# Patient Record
Sex: FEMALE | Race: WHITE | URBAN - NONMETROPOLITAN AREA
[De-identification: names, ages, dates, MRNs, and addresses within clinical notes are randomized per-mention and may not be internally consistent; named-entity substitution may affect disease eponyms.]

---

## 2018-02-02 ENCOUNTER — PATIENT OUTREACH (OUTPATIENT)
Dept: FAMILY MEDICINE CLINIC | Facility: CLINIC | Age: 53
End: 2018-02-02

## 2018-08-29 ENCOUNTER — TELEPHONE (OUTPATIENT)
Dept: FAMILY MEDICINE CLINIC | Facility: CLINIC | Age: 53
End: 2018-08-29

## 2019-01-23 ENCOUNTER — TELEPHONE (OUTPATIENT)
Dept: FAMILY MEDICINE CLINIC | Facility: CLINIC | Age: 54
End: 2019-01-23

## 2019-01-23 NOTE — TELEPHONE ENCOUNTER
Not seen since October 2016. Needs to schedule office visit and fasting labs-due for mammogram, colonoscopy, papsmear. Left detailed message for pt to call back and schedule appointment asap.

## 2019-03-04 ENCOUNTER — PATIENT OUTREACH (OUTPATIENT)
Dept: FAMILY MEDICINE CLINIC | Facility: CLINIC | Age: 54
End: 2019-03-04

## 2019-07-31 ENCOUNTER — PATIENT OUTREACH (OUTPATIENT)
Dept: FAMILY MEDICINE CLINIC | Facility: CLINIC | Age: 54
End: 2019-07-31

## 2019-07-31 NOTE — PROGRESS NOTES
Due for physical and colon cancer screening. [phone] (home)   Left message for patient to call back to schedule at earliest convenience.

## 2019-09-11 ENCOUNTER — PATIENT OUTREACH (OUTPATIENT)
Dept: FAMILY MEDICINE CLINIC | Facility: CLINIC | Age: 54
End: 2019-09-11

## 2019-10-07 ENCOUNTER — PATIENT OUTREACH (OUTPATIENT)
Dept: FAMILY MEDICINE CLINIC | Facility: CLINIC | Age: 54
End: 2019-10-07

## 2019-11-04 ENCOUNTER — TELEPHONE (OUTPATIENT)
Dept: FAMILY MEDICINE CLINIC | Facility: CLINIC | Age: 54
End: 2019-11-04

## 2019-11-04 NOTE — TELEPHONE ENCOUNTER
Spoke with patient and she verbalized understanding. She is going to have them fax the pre-op orders over today. Will call patient once we receive the orders and schedule a physical/pre-op clearance. Awaiting fax.

## 2019-11-04 NOTE — TELEPHONE ENCOUNTER
Pre-op order received and placed on Dr. Ibis Lopez desk. Please advise this nurse if ok to schedule.

## 2019-11-04 NOTE — TELEPHONE ENCOUNTER
Spoke with patient and informed her that we will need pre-op orders prior to scheduling. She will have their office fax over orders. She is also wondering if she can combine this pre-op visit with a complete physical as she states it has been a while.  Patricia

## 2019-11-04 NOTE — TELEPHONE ENCOUNTER
SURGERY ON  11/22     NEEDS PREOP EKG, SHE IS DUE FOR HER CPX  ASKING  IF SHE CAN HAVE BOTH AT SAME TIME? SHE WAS TOLD SHE DIDN'T NEED A PRESURGICAL EXAM, ONLY EKG.

## 2019-11-04 NOTE — TELEPHONE ENCOUNTER
As I have not been seen the patient in over 3 years. I would ask her to schedule for a physical which can be used as a preoperative evaluation as well.   I do need records from her surgeon

## 2019-11-04 NOTE — TELEPHONE ENCOUNTER
Okay to schedule preop but I would really like a note from the physician telling me what the problem is and what he plans to do and what kind of anesthesia as well as the date of the procedure.

## 2019-11-08 ENCOUNTER — TELEPHONE (OUTPATIENT)
Dept: FAMILY MEDICINE CLINIC | Facility: CLINIC | Age: 54
End: 2019-11-08

## 2019-11-08 NOTE — TELEPHONE ENCOUNTER
Patient scheduled.    Future Appointments   Date Time Provider Eula Barnett   11/15/2019  9:45 AM Adela Jimenez., DO [facility] [place]

## 2019-11-15 ENCOUNTER — MED REC SCAN ONLY (OUTPATIENT)
Dept: FAMILY MEDICINE CLINIC | Facility: CLINIC | Age: 54
End: 2019-11-15

## 2019-11-15 ENCOUNTER — OFFICE VISIT (OUTPATIENT)
Dept: FAMILY MEDICINE CLINIC | Facility: CLINIC | Age: 54
End: 2019-11-15
Payer: COMMERCIAL

## 2019-11-15 ENCOUNTER — APPOINTMENT (OUTPATIENT)
Dept: LAB | Age: 54
End: 2019-11-15
Attending: FAMILY MEDICINE
Payer: COMMERCIAL

## 2019-11-15 VITALS
TEMPERATURE: 98 F | BODY MASS INDEX: 40.49 KG/M2 | SYSTOLIC BLOOD PRESSURE: 122 MMHG | HEIGHT: 65.25 IN | OXYGEN SATURATION: 99 % | DIASTOLIC BLOOD PRESSURE: 82 MMHG | WEIGHT: 246 LBS | HEART RATE: 85 BPM

## 2019-11-15 DIAGNOSIS — R53.83 FATIGUE, UNSPECIFIED TYPE: ICD-10-CM

## 2019-11-15 DIAGNOSIS — Z13.220 SCREENING FOR LIPID DISORDERS: ICD-10-CM

## 2019-11-15 DIAGNOSIS — Z13.29 SCREENING FOR THYROID DISORDER: ICD-10-CM

## 2019-11-15 DIAGNOSIS — Z13.1 SCREENING FOR DIABETES MELLITUS: ICD-10-CM

## 2019-11-15 DIAGNOSIS — Z01.818 PRE-OP EVALUATION: Primary | ICD-10-CM

## 2019-11-15 DIAGNOSIS — G56.03 BILATERAL CARPAL TUNNEL SYNDROME: ICD-10-CM

## 2019-11-15 DIAGNOSIS — E66.01 MORBID OBESITY WITH BMI OF 40.0-44.9, ADULT (HCC): ICD-10-CM

## 2019-11-15 DIAGNOSIS — G56.22 CUBITAL TUNNEL SYNDROME ON LEFT: ICD-10-CM

## 2019-11-15 PROCEDURE — 84443 ASSAY THYROID STIM HORMONE: CPT | Performed by: FAMILY MEDICINE

## 2019-11-15 PROCEDURE — 80061 LIPID PANEL: CPT | Performed by: FAMILY MEDICINE

## 2019-11-15 PROCEDURE — 80053 COMPREHEN METABOLIC PANEL: CPT | Performed by: FAMILY MEDICINE

## 2019-11-15 PROCEDURE — 99396 PREV VISIT EST AGE 40-64: CPT | Performed by: FAMILY MEDICINE

## 2019-11-15 PROCEDURE — 83036 HEMOGLOBIN GLYCOSYLATED A1C: CPT | Performed by: FAMILY MEDICINE

## 2019-11-15 PROCEDURE — 36415 COLL VENOUS BLD VENIPUNCTURE: CPT | Performed by: FAMILY MEDICINE

## 2019-11-15 PROCEDURE — 93000 ELECTROCARDIOGRAM COMPLETE: CPT | Performed by: FAMILY MEDICINE

## 2019-11-15 NOTE — H&P
HPI:   Donny Dow is a 48year old female who presents for a preoperative consultation as requested by Dr. Bolivar Laird.   Patient is scheduled for left cubital tunnel release with possible anterior transposition and left endoscopic versus open carpal alexandra Cancer Brother         non-Hodkin's lymphoma   • Heart Disorder Brother    • Diabetes Brother    • Diabetes Brother    • Diabetes Brother       Social History:   Social History    Tobacco Use      Smoking status: Former Smoker        Quit date: 11/15/1995 rashes,no suspicious lesions, wlat complexion, fair-skin, scattered AKs on forearms  HEENT: Ears:  TMs intact, canals clear, hearing normal, Nose: turbinates clear,Septum midline, Pharynx: no pnd, erythema, or airway obstruction, class 2 air way  EYES:PER health screening measures, would recommend a baseline colonoscopy. Patient may check with her insurance for coverage on Cologuard as an alternative  Discussed importance of monthly self breast exams and mammograms annually.   Will obtain records of most re

## 2019-11-16 DIAGNOSIS — R73.03 PREDIABETES: Primary | ICD-10-CM

## 2019-11-16 DIAGNOSIS — E78.00 ELEVATED CHOLESTEROL: ICD-10-CM

## 2020-09-02 ENCOUNTER — TELEPHONE (OUTPATIENT)
Dept: FAMILY MEDICINE CLINIC | Facility: CLINIC | Age: 55
End: 2020-09-02

## 2020-11-23 ENCOUNTER — OFFICE VISIT (OUTPATIENT)
Dept: FAMILY MEDICINE CLINIC | Facility: CLINIC | Age: 55
End: 2020-11-23
Payer: COMMERCIAL

## 2020-11-23 VITALS
DIASTOLIC BLOOD PRESSURE: 80 MMHG | HEART RATE: 83 BPM | WEIGHT: 239 LBS | SYSTOLIC BLOOD PRESSURE: 120 MMHG | HEIGHT: 65.25 IN | BODY MASS INDEX: 39.34 KG/M2 | TEMPERATURE: 97 F | OXYGEN SATURATION: 99 %

## 2020-11-23 DIAGNOSIS — R10.13 EPIGASTRIC PAIN: Primary | ICD-10-CM

## 2020-11-23 DIAGNOSIS — M54.6 ACUTE BILATERAL THORACIC BACK PAIN: ICD-10-CM

## 2020-11-23 PROCEDURE — 3079F DIAST BP 80-89 MM HG: CPT | Performed by: FAMILY MEDICINE

## 2020-11-23 PROCEDURE — 99213 OFFICE O/P EST LOW 20 MIN: CPT | Performed by: FAMILY MEDICINE

## 2020-11-23 PROCEDURE — 3008F BODY MASS INDEX DOCD: CPT | Performed by: FAMILY MEDICINE

## 2020-11-23 PROCEDURE — 93000 ELECTROCARDIOGRAM COMPLETE: CPT | Performed by: FAMILY MEDICINE

## 2020-11-23 PROCEDURE — 3074F SYST BP LT 130 MM HG: CPT | Performed by: FAMILY MEDICINE

## 2020-11-23 PROCEDURE — 99072 ADDL SUPL MATRL&STAF TM PHE: CPT | Performed by: FAMILY MEDICINE

## 2020-11-23 NOTE — PROGRESS NOTES
HPI:    Patient ID: Tia Cantu is a 50year old female. Patient presents with:  Back Pain: sat afternoon, sharp pain in middle of back, sweating, nausous, less than 10 min, happened again after laying down/ felt warm.      Pt had been moving some thi guarding. Musculoskeletal:      Thoracic back: Normal.      Lumbar back: Normal.   Neurological: She is alert and oriented to person, place, and time. Skin: Skin is warm and dry. Psychiatric: She has a normal mood and affect.    Blood pressure 120/80,

## 2020-11-23 NOTE — PATIENT INSTRUCTIONS
I discussed various possible causes for patient's symptoms including esophageal spasm, biliary colic, reflux, acute muscular spasm, cardiovascular etiologies less likely.   Of asked patient to eat a low-fat low-carb diet, begin some form of daily aerobic ac

## 2021-06-18 ENCOUNTER — TELEPHONE (OUTPATIENT)
Dept: FAMILY MEDICINE CLINIC | Facility: CLINIC | Age: 56
End: 2021-06-18

## 2021-06-18 NOTE — TELEPHONE ENCOUNTER
Spoke with patient who states she tested positive for COVID on 6/15/21. Her remaining symptoms are headache and congestion. She never has had a fever. She is wanting to know when she can go back to work. She runs a senior living facility. Please advise.

## 2021-06-18 NOTE — TELEPHONE ENCOUNTER
Pt is covid positive since Corazon 15. She wants to know how long to be isolated and has some other questions. 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

## 2021-06-18 NOTE — TELEPHONE ENCOUNTER
Current CDC guidelines state that patient may discontinue isolation 10 days from onset of symptoms that as long as he remains afebrile and symptoms are improving.

## 2021-12-15 ENCOUNTER — TELEPHONE (OUTPATIENT)
Dept: FAMILY MEDICINE CLINIC | Facility: CLINIC | Age: 56
End: 2021-12-15

## 2021-12-15 DIAGNOSIS — Z12.31 SCREENING MAMMOGRAM FOR BREAST CANCER: Primary | ICD-10-CM

## 2021-12-15 NOTE — TELEPHONE ENCOUNTER
Yaneth Mcdowell is calling she needs an order for a mammo faxed over to [facility] please call once this is done thank you

## 2021-12-16 ENCOUNTER — TELEPHONE (OUTPATIENT)
Dept: FAMILY MEDICINE CLINIC | Facility: CLINIC | Age: 56
End: 2021-12-16

## 2021-12-16 NOTE — TELEPHONE ENCOUNTER
[id number] [address] Crista Espinosa NEVER RECEIVED MAMMO ORDER, PLEASE CALL PT BEFORE REFAXING, SOMETHING ABOUT SINCE SHE IS HAVING SYMPTOMS THAT ORDER NEEDS TO BE CHANGED

## 2021-12-16 NOTE — TELEPHONE ENCOUNTER
KINJAL---spoke with pt. She called yesterday and asked for a mammogram order to be faxed to [address] [place] [id number] . Order was faxed. Pt called there today to schedule an appt and was told the order hasn't been rec'd yet, and it would need to be refaxed.    *In that discussion,

## 2021-12-17 ENCOUNTER — OFFICE VISIT (OUTPATIENT)
Dept: FAMILY MEDICINE CLINIC | Facility: CLINIC | Age: 56
End: 2021-12-17
Payer: COMMERCIAL

## 2021-12-17 VITALS
SYSTOLIC BLOOD PRESSURE: 130 MMHG | DIASTOLIC BLOOD PRESSURE: 88 MMHG | BODY MASS INDEX: 38.52 KG/M2 | HEART RATE: 84 BPM | OXYGEN SATURATION: 98 % | HEIGHT: 65.25 IN | RESPIRATION RATE: 16 BRPM | TEMPERATURE: 99 F | WEIGHT: 234 LBS

## 2021-12-17 DIAGNOSIS — R07.89 CHEST WALL DISCOMFORT: ICD-10-CM

## 2021-12-17 DIAGNOSIS — Z12.31 ENCOUNTER FOR SCREENING MAMMOGRAM FOR BREAST CANCER: Primary | ICD-10-CM

## 2021-12-17 PROCEDURE — 3079F DIAST BP 80-89 MM HG: CPT | Performed by: FAMILY MEDICINE

## 2021-12-17 PROCEDURE — 3075F SYST BP GE 130 - 139MM HG: CPT | Performed by: FAMILY MEDICINE

## 2021-12-17 PROCEDURE — 3008F BODY MASS INDEX DOCD: CPT | Performed by: FAMILY MEDICINE

## 2021-12-17 PROCEDURE — 99213 OFFICE O/P EST LOW 20 MIN: CPT | Performed by: FAMILY MEDICINE

## 2021-12-17 NOTE — PATIENT INSTRUCTIONS
I advised patient today feel her symptoms are more related to chest wall spasms rather than breast disease but will certainly encourage screening 3D mammogram.  Order faxed to [facility] the patient's request  Reviewed proper posture and workplace ergonom

## 2021-12-17 NOTE — PROGRESS NOTES
Guido Curtis is a 49year old female. Patient presents with:  Breast Pain: left; pulling feeling; going on for month happening more frequent      HPI:   C/o left breast pain off and on over the past yr, occurs every few months, fleeting, may occur a few t pain  EXAM:   /88   Pulse 84   Temp 99 °F (37.2 °C) (Temporal)   Resp 16   Ht 5' 5.25\" (1.657 m)   Wt 234 lb (106.1 kg)   LMP 01/15/2013   SpO2 98%   BMI 38.64 kg/m²   GENERAL: well developed, well nourished,in no apparent distress, well hydrated  S

## 2022-02-21 ENCOUNTER — TELEPHONE (OUTPATIENT)
Dept: FAMILY MEDICINE CLINIC | Facility: CLINIC | Age: 57
End: 2022-02-21

## 2022-02-22 NOTE — TELEPHONE ENCOUNTER
ADVISE PT MAMMOGRAM OK / BENIGN FINDINGS, REPEAT ANNUALLY  If breast tenderness persists, would recommend opinion from Dr. Jimy Rider, breast surgeon    Elizabeth Quiñonez.  Mohsen Restrepo

## 2022-02-22 NOTE — TELEPHONE ENCOUNTER
Pt advised of below. States she is still having L breast pain at least every other day. She will c/b to schedule an appt with Dr. Eugenio Cummings.  Referral placed

## 2022-06-17 ENCOUNTER — OFFICE VISIT (OUTPATIENT)
Dept: FAMILY MEDICINE CLINIC | Facility: CLINIC | Age: 57
End: 2022-06-17
Payer: COMMERCIAL

## 2022-06-17 VITALS
WEIGHT: 230 LBS | DIASTOLIC BLOOD PRESSURE: 93 MMHG | HEIGHT: 66 IN | BODY MASS INDEX: 36.96 KG/M2 | TEMPERATURE: 98 F | HEART RATE: 102 BPM | SYSTOLIC BLOOD PRESSURE: 127 MMHG | OXYGEN SATURATION: 98 % | RESPIRATION RATE: 15 BRPM

## 2022-06-17 DIAGNOSIS — B34.9 VIRAL SYNDROME: ICD-10-CM

## 2022-06-17 DIAGNOSIS — J02.9 SORE THROAT: Primary | ICD-10-CM

## 2022-06-17 LAB — CONTROL LINE PRESENT WITH A CLEAR BACKGROUND (YES/NO): YES YES/NO

## 2022-06-17 PROCEDURE — 87081 CULTURE SCREEN ONLY: CPT | Performed by: PHYSICIAN ASSISTANT

## 2022-06-17 PROCEDURE — 3080F DIAST BP >= 90 MM HG: CPT | Performed by: PHYSICIAN ASSISTANT

## 2022-06-17 PROCEDURE — 3008F BODY MASS INDEX DOCD: CPT | Performed by: PHYSICIAN ASSISTANT

## 2022-06-17 PROCEDURE — 3074F SYST BP LT 130 MM HG: CPT | Performed by: PHYSICIAN ASSISTANT

## 2022-06-17 PROCEDURE — 99213 OFFICE O/P EST LOW 20 MIN: CPT | Performed by: PHYSICIAN ASSISTANT

## 2022-06-17 PROCEDURE — 87880 STREP A ASSAY W/OPTIC: CPT | Performed by: PHYSICIAN ASSISTANT

## 2022-06-18 LAB — SARS-COV-2 RNA RESP QL NAA+PROBE: DETECTED

## 2022-11-14 ENCOUNTER — TELEPHONE (OUTPATIENT)
Dept: FAMILY MEDICINE CLINIC | Facility: CLINIC | Age: 57
End: 2022-11-14

## 2022-11-14 NOTE — TELEPHONE ENCOUNTER
HEART HAS BEEN RACING DAILY & BP ELEV FOR ABOUT A WEEK, FAMILY HX OF HEART DISEASE, NO OPENINGS TILL Thursday, CALL PT

## 2022-11-14 NOTE — TELEPHONE ENCOUNTER
KINJAL--spoke with pt. Reports episodes of her heart feeling like it's \"racing\" and \"fluttering\" x1 week. Has been checking BP also---180/140 after running up and down stairs several times. Has been 140/ 80-90 at reast. HR ~80 at rest.  She has only checked HR with BP monitor. Instructed pt on how to check pulse manually. Pt concerned because family hx of heart disease. Denies chest pain. Reports increased life stress. OV scheduled on 11/17/22 (first available). Advised to continue to check both and bring in BP and HR readings for review. If you want to see her sooner/ please send back to me.   Otherwise you can close chart

## 2022-11-17 ENCOUNTER — OFFICE VISIT (OUTPATIENT)
Dept: FAMILY MEDICINE CLINIC | Facility: CLINIC | Age: 57
End: 2022-11-17
Payer: COMMERCIAL

## 2022-11-17 VITALS
WEIGHT: 239 LBS | HEART RATE: 86 BPM | OXYGEN SATURATION: 97 % | BODY MASS INDEX: 39 KG/M2 | DIASTOLIC BLOOD PRESSURE: 86 MMHG | SYSTOLIC BLOOD PRESSURE: 122 MMHG | TEMPERATURE: 98 F

## 2022-11-17 DIAGNOSIS — R00.2 PALPITATIONS: Primary | ICD-10-CM

## 2022-11-17 DIAGNOSIS — F43.20 ADULT SITUATIONAL STRESS DISORDER: ICD-10-CM

## 2022-11-17 DIAGNOSIS — F41.9 ANXIETY: ICD-10-CM

## 2022-11-17 PROCEDURE — 3079F DIAST BP 80-89 MM HG: CPT | Performed by: FAMILY MEDICINE

## 2022-11-17 PROCEDURE — 99214 OFFICE O/P EST MOD 30 MIN: CPT | Performed by: FAMILY MEDICINE

## 2022-11-17 PROCEDURE — 3074F SYST BP LT 130 MM HG: CPT | Performed by: FAMILY MEDICINE

## 2022-11-17 NOTE — PATIENT INSTRUCTIONS
30 minutes spent discussing palpitations, fibrillation, pathologic arrhythmias and contributing factors. I advised patient that I strongly suspect she has benign PVCs that are related to her recent stressors. Past EKG was unremarkable for similar symptoms 2 years ago. Discussed conservative stress management strategies including relaxation breathing, visualizing, prayer, aerobic activity etc.  Discussed medication options however patient declines need for them at this time. Will check 48-hour Holter to rule out any sustained arrhythmias. If increased burden of PVCs/PACs, patient may benefit from low-dose beta-blocker.   Further recommendations pending Holter

## 2022-11-29 ENCOUNTER — HOSPITAL ENCOUNTER (OUTPATIENT)
Dept: GENERAL RADIOLOGY | Age: 57
Discharge: HOME | End: 2022-11-29
Attending: FAMILY MEDICINE
Payer: COMMERCIAL

## 2022-11-29 DIAGNOSIS — R00.2 PALPITATIONS: ICD-10-CM

## 2022-11-29 PROCEDURE — 93225 XTRNL ECG REC<48 HRS REC: CPT | Performed by: FAMILY MEDICINE

## 2024-02-26 ENCOUNTER — OFFICE VISIT (OUTPATIENT)
Dept: FAMILY MEDICINE CLINIC | Facility: CLINIC | Age: 59
End: 2024-02-26
Payer: COMMERCIAL

## 2024-02-26 VITALS
HEART RATE: 89 BPM | TEMPERATURE: 98 F | HEIGHT: 66 IN | DIASTOLIC BLOOD PRESSURE: 86 MMHG | WEIGHT: 240 LBS | SYSTOLIC BLOOD PRESSURE: 138 MMHG | BODY MASS INDEX: 38.57 KG/M2 | RESPIRATION RATE: 18 BRPM | OXYGEN SATURATION: 98 %

## 2024-02-26 DIAGNOSIS — R39.9 UTI SYMPTOMS: Primary | ICD-10-CM

## 2024-02-26 LAB
BILIRUBIN: NEGATIVE
GLUCOSE (URINE DIPSTICK): NEGATIVE MG/DL
KETONES (URINE DIPSTICK): NEGATIVE MG/DL
MULTISTIX LOT#: ABNORMAL NUMERIC
NITRITE, URINE: NEGATIVE
PH, URINE: 7 (ref 4.5–8)
PROTEIN (URINE DIPSTICK): 30 MG/DL
SPECIFIC GRAVITY: 1.02 (ref 1–1.03)
URINE-COLOR: YELLOW
UROBILINOGEN,SEMI-QN: 0.2 MG/DL (ref 0–1.9)

## 2024-02-26 PROCEDURE — 87077 CULTURE AEROBIC IDENTIFY: CPT | Performed by: NURSE PRACTITIONER

## 2024-02-26 PROCEDURE — 87086 URINE CULTURE/COLONY COUNT: CPT | Performed by: NURSE PRACTITIONER

## 2024-02-26 PROCEDURE — 87186 SC STD MICRODIL/AGAR DIL: CPT | Performed by: NURSE PRACTITIONER

## 2024-02-26 RX ORDER — NITROFURANTOIN 25; 75 MG/1; MG/1
100 CAPSULE ORAL 2 TIMES DAILY
Qty: 10 CAPSULE | Refills: 0 | Status: SHIPPED | OUTPATIENT
Start: 2024-02-26 | End: 2024-03-02

## 2024-02-27 NOTE — PATIENT INSTRUCTIONS
1. Rest. Drink plenty of fluids.  2. Nitrofurantoin as prescribed.  3. We will send urine culture to lab and call you with results in 3-4 days. At that time we will discuss continuing, stopping, or changing the antibiotic based on the urine culture results.  4. Follow up with PMD in 4-5 days for reeval. Follow up sooner or go to the emergency department immediately if symptoms worsen, change, you develop fevers, flank pain, vomiting, pelvic pain, vaginal discharge/bleeding or if you have any concerns.

## 2024-02-27 NOTE — PROGRESS NOTES
CHIEF COMPLAINT:     Chief Complaint   Patient presents with    UTI     Started today, urgency,  burning        HPI:   Mayelin Duran is a 58 year old female who presents with symptoms of UTI. Complaining of urinary frequency, urgency, dysuria that started today.  Denies flank pain, fever, hematuria, nausea, or vomiting.  Denies abdominal pain, pelvic pain, vaginal discharge, bleeding, itching, or concerns for std's. Tolerates PO well at home. No n/v/d.  Denies any other aggravating or relieving factors at home. Denies any other treatment attempts prior to arrival.       Current Outpatient Medications   Medication Sig Dispense Refill    nitrofurantoin monohydrate macro (MACROBID) 100 MG Oral Cap Take 1 capsule (100 mg total) by mouth 2 (two) times daily for 5 days. 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  GI: Denies abdominal pain. No N/V/D.   : See HPI.  NEURO: no headaches.    EXAM:   /86   Pulse 89   Temp 98.3 °F (36.8 °C)   Resp 18   Ht 5' 6\" (1.676 m)   Wt 240 lb (108.9 kg)   LMP 01/15/2013   SpO2 98%   BMI 38.74 kg/m²   GENERAL: well developed, well nourished,in no apparent distress  CARDIO: RRR, no murmurs  LUNGS: clear to ausculation bilaterally, no wheezing or rhonchi  GI: No tenderness or guarding with palpation.No hepatosplenomegaly.  : No  suprapubic tenderness. No bladder distention, or CVAT     Recent Results (from the past 24 hour(s))   URINALYSIS, AUTO, W/O SCOPE    Collection Time: 02/26/24  6:51 PM   Result Value Ref Range    Glucose Urine Negative Negative mg/dL    Bilirubin Urine Negative Negative    Ketones, UA Negative Negative - Trace mg/dL    Spec Gravity 1.020 1.005 - 1.030    Blood Urine Moderate (A) Negative    PH Urine 7.0 5.0 - 8.0    Protein Urine 30 (A) Negative - Trace mg/dL    Urobilinogen Urine 0.2 0.2 - 1.0 mg/dL    Nitrite Urine Negative Negative    Leukocyte Esterase Urine Moderate (A) Negative    APPEARANCE cloudy Clear    Color Urine yellow Yellow    Multistix Lot# [id number] Numeric    Multistix Expiration Date 12/31/24 Date         ASSESSMENT AND PLAN:       ICD-10-CM    1. UTI symptoms  R39.9 Urine Culture, Routine     URINALYSIS, AUTO, W/O SCOPE     Urine Culture, Routine     nitrofurantoin monohydrate macro (MACROBID) 100 MG Oral Cap        Urine dip: + blood and leuks  Urine culture sent to lab.  Urine hcg: n/a    Discussed HPI and physical exam with pt. Pt has a reassuring physical exam consistent with a lower uti. Treatment options discussed with patient and explained in detail. Will start nitrofurantoin pending urine culture results. The risks, benefits and potential side effects of the medications were reviewed. Alternatives were discussed. Monitoring parameters and expected course outlined. We discussed signs and symptoms that should prompt her to go to the emergency department immediately for evaluation including any fevers, flank pain, abdominal pain, vomiting, vaginal bleeding or if she has any concerns. Patient to call PCP or go to emergency department if symptoms fail to respond as outlined, or worsen in any way. The patient agreed with the plan.       Patient Instructions   1. Rest. Drink plenty of fluids.  2. Nitrofurantoin as prescribed.  3. We will send urine culture to lab and call you with results in 3-4  days. At that time we will discuss continuing, stopping, or changing the antibiotic based on the urine culture results.  4. Follow up with PMD in 4-5 days for reeval. Follow up sooner or go to the emergency department immediately if symptoms worsen, change, you develop fevers, flank pain, vomiting, pelvic pain, vaginal discharge/bleeding or if you have any concerns.

## 2024-08-13 ENCOUNTER — LABORATORY ENCOUNTER (OUTPATIENT)
Dept: LAB | Age: 59
End: 2024-08-13
Attending: FAMILY MEDICINE
Payer: COMMERCIAL

## 2024-08-13 ENCOUNTER — OFFICE VISIT (OUTPATIENT)
Dept: FAMILY MEDICINE CLINIC | Facility: CLINIC | Age: 59
End: 2024-08-13
Payer: COMMERCIAL

## 2024-08-13 VITALS
WEIGHT: 235.38 LBS | BODY MASS INDEX: 38.75 KG/M2 | TEMPERATURE: 98 F | SYSTOLIC BLOOD PRESSURE: 122 MMHG | HEART RATE: 88 BPM | DIASTOLIC BLOOD PRESSURE: 84 MMHG | HEIGHT: 65.5 IN | RESPIRATION RATE: 16 BRPM

## 2024-08-13 DIAGNOSIS — Z13.29 SCREENING FOR THYROID DISORDER: ICD-10-CM

## 2024-08-13 DIAGNOSIS — Z13.220 SCREENING, LIPID: ICD-10-CM

## 2024-08-13 DIAGNOSIS — Z12.31 ENCOUNTER FOR SCREENING MAMMOGRAM FOR BREAST CANCER: ICD-10-CM

## 2024-08-13 DIAGNOSIS — R73.01 IMPAIRED FASTING GLUCOSE: ICD-10-CM

## 2024-08-13 DIAGNOSIS — Z11.51 SCREENING FOR HUMAN PAPILLOMAVIRUS (HPV): ICD-10-CM

## 2024-08-13 DIAGNOSIS — E66.01 SEVERE OBESITY (BMI 35.0-39.9) WITH COMORBIDITY (HCC): ICD-10-CM

## 2024-08-13 DIAGNOSIS — R41.3 MEMORY CHANGES: ICD-10-CM

## 2024-08-13 DIAGNOSIS — Z13.21 ENCOUNTER FOR VITAMIN DEFICIENCY SCREENING: ICD-10-CM

## 2024-08-13 DIAGNOSIS — F43.20 ADULT SITUATIONAL STRESS DISORDER: ICD-10-CM

## 2024-08-13 DIAGNOSIS — Z23 NEED FOR VACCINATION: ICD-10-CM

## 2024-08-13 DIAGNOSIS — Z00.00 PREVENTATIVE HEALTH CARE: ICD-10-CM

## 2024-08-13 DIAGNOSIS — Z12.4 SCREENING FOR CERVICAL CANCER: ICD-10-CM

## 2024-08-13 DIAGNOSIS — Z12.11 SCREEN FOR COLON CANCER: ICD-10-CM

## 2024-08-13 DIAGNOSIS — Z00.00 PREVENTATIVE HEALTH CARE: Primary | ICD-10-CM

## 2024-08-13 LAB
ALBUMIN SERPL-MCNC: 4.9 G/DL (ref 3.2–4.8)
ALBUMIN/GLOB SERPL: 1.6 {RATIO} (ref 1–2)
ALP LIVER SERPL-CCNC: 89 U/L
ALT SERPL-CCNC: 42 U/L
ANION GAP SERPL CALC-SCNC: 7 MMOL/L (ref 0–18)
AST SERPL-CCNC: 34 U/L (ref ?–34)
BILIRUB SERPL-MCNC: 0.8 MG/DL (ref 0.3–1.2)
BUN BLD-MCNC: 12 MG/DL (ref 9–23)
CALCIUM BLD-MCNC: 10.1 MG/DL (ref 8.7–10.4)
CHLORIDE SERPL-SCNC: 105 MMOL/L (ref 98–112)
CHOLEST SERPL-MCNC: 231 MG/DL (ref ?–200)
CO2 SERPL-SCNC: 26 MMOL/L (ref 21–32)
CREAT BLD-MCNC: 0.69 MG/DL
EGFRCR SERPLBLD CKD-EPI 2021: 102 ML/MIN/1.73M2 (ref 60–?)
FASTING PATIENT LIPID ANSWER: YES
FASTING STATUS PATIENT QL REPORTED: YES
GLOBULIN PLAS-MCNC: 3 G/DL (ref 2–3.5)
GLUCOSE BLD-MCNC: 80 MG/DL (ref 70–99)
HDLC SERPL-MCNC: 58 MG/DL (ref 40–59)
LDLC SERPL CALC-MCNC: 157 MG/DL (ref ?–100)
NONHDLC SERPL-MCNC: 173 MG/DL (ref ?–130)
OSMOLALITY SERPL CALC.SUM OF ELEC: 285 MOSM/KG (ref 275–295)
POTASSIUM SERPL-SCNC: 3.9 MMOL/L (ref 3.5–5.1)
PROT SERPL-MCNC: 7.9 G/DL (ref 5.7–8.2)
SODIUM SERPL-SCNC: 138 MMOL/L (ref 136–145)
TRIGL SERPL-MCNC: 92 MG/DL (ref 30–149)
TSI SER-ACNC: 1.34 MIU/ML (ref 0.55–4.78)
VIT B12 SERPL-MCNC: 561 PG/ML (ref 211–911)
VIT D+METAB SERPL-MCNC: 32.1 NG/ML (ref 30–100)
VLDLC SERPL CALC-MCNC: 18 MG/DL (ref 0–30)

## 2024-08-13 PROCEDURE — 82306 VITAMIN D 25 HYDROXY: CPT | Performed by: FAMILY MEDICINE

## 2024-08-13 PROCEDURE — 3079F DIAST BP 80-89 MM HG: CPT | Performed by: FAMILY MEDICINE

## 2024-08-13 PROCEDURE — 83036 HEMOGLOBIN GLYCOSYLATED A1C: CPT | Performed by: FAMILY MEDICINE

## 2024-08-13 PROCEDURE — 88175 CYTOPATH C/V AUTO FLUID REDO: CPT | Performed by: FAMILY MEDICINE

## 2024-08-13 PROCEDURE — 80053 COMPREHEN METABOLIC PANEL: CPT | Performed by: FAMILY MEDICINE

## 2024-08-13 PROCEDURE — 80061 LIPID PANEL: CPT | Performed by: FAMILY MEDICINE

## 2024-08-13 PROCEDURE — 90715 TDAP VACCINE 7 YRS/> IM: CPT | Performed by: FAMILY MEDICINE

## 2024-08-13 PROCEDURE — 90471 IMMUNIZATION ADMIN: CPT | Performed by: FAMILY MEDICINE

## 2024-08-13 PROCEDURE — 82607 VITAMIN B-12: CPT | Performed by: FAMILY MEDICINE

## 2024-08-13 PROCEDURE — 3074F SYST BP LT 130 MM HG: CPT | Performed by: FAMILY MEDICINE

## 2024-08-13 PROCEDURE — 3008F BODY MASS INDEX DOCD: CPT | Performed by: FAMILY MEDICINE

## 2024-08-13 PROCEDURE — 99396 PREV VISIT EST AGE 40-64: CPT | Performed by: FAMILY MEDICINE

## 2024-08-13 PROCEDURE — 84443 ASSAY THYROID STIM HORMONE: CPT | Performed by: FAMILY MEDICINE

## 2024-08-13 NOTE — H&P
HPI:   Mayelin Duran is a 58 year old female who presents for a complete physical exam.  Patient concerns:   Patient has concerns about some forgetfulness.  She sometimes will forget something that occurred a week or 2 ago.  Her mother does have Alzheimer's but did not get diagnosed until her late 80s  Patient also has questions regarding weight loss.  She admits that if she does not see rapid results she is likely to give up.  She admits to 2 meals per day usually breakfast and dinner she admits to being active but without sustained aerobic activity.     Wt Readings from Last 6 Encounters:   08/13/24 235 lb 6 oz (106.8 kg)   02/26/24 240 lb (108.9 kg)   11/17/22 239 lb (108.4 kg)   06/17/22 230 lb (104.3 kg)   12/17/21 234 lb (106.1 kg)   11/23/20 239 lb (108.4 kg)     Body mass index is 38.57 kg/m².     Cholesterol, Total (mg/dL)   Date Value   11/15/2019 235 (H)     CHOLESTEROL (mg/dL)   Date Value   03/11/2013 183   03/11/2013 183     HDL Cholesterol (mg/dL)   Date Value   11/15/2019 55     HDL CHOL (mg/dL)   Date Value   03/11/2013 52   03/11/2013 52     LDL Cholesterol (mg/dL)   Date Value   11/15/2019 164 (H)     LDL CHOLESTROL (mg/dL)   Date Value   03/11/2013 116   03/11/2013 116     AST (U/L)   Date Value   11/15/2019 37   03/11/2013 17   03/11/2013 17     ALT (U/L)   Date Value   11/15/2019 66 (H)   03/11/2013 30   03/11/2013 30        No current outpatient medications on file.     Allergies   Allergen Reactions    Amoxicillin HIVES        Past Medical History:    Bowen's disease of lower extremity    RIGHT KNEE      Past Surgical History:   Procedure Laterality Date    Other surgical history Left 11/22/2019    cubital tunnel release    Skin surgery      EXCISION OF LESION RIGHT KNEE      Family History   Problem Relation Age of Onset    Heart Disorder Father     Hypertension Father     Lipids Father     Heart Disorder Mother         a-fib    Osteoporosis Mother     Hypertension Sister     Heart Disorder  Brother 54        MI    Hypertension Sister     Thyroid disease Sister     Cancer Brother         kidney    Cancer Brother         non-Hodkin's lymphoma    Heart Disorder Brother     Diabetes Brother     Diabetes Brother     Diabetes Brother       Social History:   Social History     Socioeconomic History    Marital status:    Tobacco Use    Smoking status: Former     Current packs/day: 0.00     Types: Cigarettes     Quit date: 11/15/1995     Years since quittin.7     Passive exposure: Never    Smokeless tobacco: Never   Vaping Use    Vaping status: Never Used   Substance and Sexual Activity    Alcohol use: Yes     Comment: occ    Drug use: No   Other Topics Concern    Exercise No    Seat Belt Yes    Stress Concern Yes    Weight Concern Yes   96 yr old mother w/ dementia lives w/ her  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 : +. Children: 3.   Exercise: minimal.  Diet: watches minimally  Gyne Hx: LMP:  2015,  G 2, P 3003,  Last Mammogram: 24     REVIEW OF SYSTEMS:   GENERAL: generally feels well, no unusual fatigue,no excessive daytime drowsiness, good appetite  SKIN: denies any unusual skin lesions or rashes  EYES:denies blurred vision or double vision, glasses/ contacts: +, last eye exam :2 yrs ago  HEENT: denies nasal congestion, pnd, or ST, denies snoring and reported periods of apnea, denies hearing deficits  LUNGS: denies shortness of breath with exertion, no coughing or wheezing  CARDIOVASCULAR: denies chest pain on exertion, palpations, anginal equivalent symptoms, no claudication   GI: denies abdominal pain,denies heartburn, constipation, diarrhea, or change in bowel habits  : denies dysuria, vaginal discharge or itching, no uterine bleeding, no incontinence    MUSCULOSKELETAL: denies back or joint pain  NEURO: denies headaches, tremors, dizziness, numbness, tingling, muscular weakness  PSYCHE: denies depression or anxiety, denies any sleep difficulty, lots of stress  caring for her mother  HEMATOLOGIC: denies unexplained bruising or bleeding  ENDOCRINE: denies heat or cold intolerance , no unexplained wt loss or gain  EXAM:   /84 (BP Location: Right arm, Patient Position: Sitting, Cuff Size: large)   Pulse 88   Temp 98.2 °F (36.8 °C) (Temporal)   Resp 16   Ht 5' 5.5\" (1.664 m)   Wt 235 lb 6 oz (106.8 kg)   LMP 01/15/2013   BMI 38.57 kg/m²   Body mass index is 38.57 kg/m².   GENERAL: well developed, well nourished,in no apparent distress  SKIN: no rashes,no suspicious lesions, fair skin, strawberry blond hair  HEENT: Ears:  TMs intact, canals clear, hearing normal, Nose: turbinates clear,Septum midline, Pharynx: no pnd, erythema, or airway obstruction  EYES:PERRLA, EOMI, Fundi: benign,conjunctiva are clear  NECK: supple,no adenopathy,no bruits, no thyromegaly or palpable nodules  BREAST: Pendulous, no palpable masses, nipples everted, no dimpling or retractions, no dc, no axillary or supraclavicular lymph nodes palpable  LUNGS: clear to auscultation, no w/r/r  CARDIO: RRR without murmur, strong peripheral pulses, no peripheral edema  GI: good BS's,no masses, HSM or tenderness  :  External genitalia: normal, Introitus is normal, Vagnal mucosa: slightly atrophic, no cystocele, no rectocele, no discharge,cervix is pink, no cmt, Uterus: AV/AF, no adnexal masses or tenderness   MUSCULOSKELETAL: back is non-tender  EXTREMITIES: FROM of all joints tested,  no cyanosis, clubbing or edema, no varicosities  NEURO: Oriented times three,CN 2-12 are intact,motor and sensory are grossly intact, Gait: normal, DTRs +2/4 bilaterally, Monofilament testing intact on plantar aspect of feet  PSYCH:  Speech clear and coherent, judgement: good, appearance: neat, Affect:slightly anxious    ASSESSMENT AND PLAN:   Mayelin Duran is a 58 year old female   Encounter Diagnoses   Name Primary?    Preventative health care Yes    Screen for colon cancer     Screening for cervical cancer      Screening, lipid     Impaired fasting glucose     Screening for thyroid disorder     Adult situational stress disorder     Severe obesity (BMI 35.0-39.9) with comorbidity (HCC)     Need for vaccination     Encounter for vitamin deficiency screening     Screening for human papillomavirus (HPV)     Encounter for screening mammogram for breast cancer     Memory changes      Orders Placed This Encounter   Procedures    Lipid Panel [E]    Comp Metabolic Panel (14) [E]    TSH [E]    Vitamin D [E]    Vitamin B12 [E]    Hemoglobin A1C [E]    Hpv High Risk , Thin Prep Collection    TdaP (Boostrix/Adacel) Vaccine (> 7 Y)    ThinPrep PAP Smear     Meds & Refills for this Visit:  Requested Prescriptions      No prescriptions requested or ordered in this encounter     Imaging & Consults:  COLOGUARD COLON CANCER SCREENING (EXTERNAL)  TETANUS, DIPHTHERIA TOXOIDS AND ACELLULAR PERTUSIS VACCINE (TDAP), >7 YEARS, IM USE  [facility] 2D+3D SCREENING BILAT (CPT=77067/31036)  No follow-ups on file.  Patient Instructions   1. Preventative health care  I reviewed age-appropriate preventive health screening exams as well as immunizations  - Lipid Panel [E]; Future  - Comp Metabolic Panel (14) [E]; Future  - TSH [E]; Future  - COLOGUARD COLON CANCER SCREENING (EXTERNAL)    2. Screen for colon cancer  Reviewed current recommendations and options for colon cancer screening.  Will order Cologuard at patient's request  - COLOGUARD COLON CANCER SCREENING (EXTERNAL)    3. Screening for cervical cancer  Reviewed current guidelines for cervical cancer screening, ThinPrep Pap smear obtained  - ThinPrep PAP Smear; Future    4. Screening, lipid  Reviewed goals for lipids  - Lipid Panel [E]; Future    5. Impaired fasting glucose  Check A1c  - Hemoglobin A1C [E]; Future    6. Screening for thyroid disorder  Reviewed signs and symptoms of thyroid disease  - TSH [E]; Future    7. Adult situational stress disorder  Discussed conservative management strategies  for stress and anxiety    8. Severe obesity (BMI 35.0-39.9) with comorbidity (HCC)  Discussed weight loss management strategies including lower carbohydrate food choices, avoidance of starches, eating behavior modification and daily aerobic activity with a realistic goal of 1 to 2 pound weight reduction per week.  Discussed risks and benefits of medications including indications, anticipated results and duration of treatment    9. Need for vaccination  Reviewed age-appropriate medications.  Tdap booster given.  Patient wishes to think about the shingles vaccine  - TdaP (Boostrix/Adacel) Vaccine (> 7 Y)    10. Encounter for vitamin deficiency screening  Will check vitamin D and B12 levels  - Vitamin D [E]; Future  - Vitamin B12 [E]; Future    11. Screening for human papillomavirus (HPV)  Will check HPV screening from Pap smear  - Hpv High Risk , Thin Prep Collection; Future    12. Encounter for screening mammogram for breast cancer  Discussed importance of monthly self breast exams and annual mammograms.  Patient wishes to schedule her mammogram at [facility] 2D+3D SCREENING BILAT (CPT=77067/84387); Future    13. Memory changes  I discussed age related memory changes as well as contributing factors especially metabolic causes, situational stressors etc.  Will rule out metabolic causes, anticipatory guidance reviewed    Duncan Mina DO, FAAFP

## 2024-08-13 NOTE — PATIENT INSTRUCTIONS
1. Preventative health care  I reviewed age-appropriate preventive health screening exams as well as immunizations  - Lipid Panel [E]; Future  - Comp Metabolic Panel (14) [E]; Future  - TSH [E]; Future  - COLOGUARD COLON CANCER SCREENING (EXTERNAL)    2. Screen for colon cancer  Reviewed current recommendations and options for colon cancer screening.  Will order Cologuard at patient's request  - COLOGUARD COLON CANCER SCREENING (EXTERNAL)    3. Screening for cervical cancer  Reviewed current guidelines for cervical cancer screening, ThinPrep Pap smear obtained  - ThinPrep PAP Smear; Future    4. Screening, lipid  Reviewed goals for lipids  - Lipid Panel [E]; Future    5. Impaired fasting glucose  Check A1c  - Hemoglobin A1C [E]; Future    6. Screening for thyroid disorder  Reviewed signs and symptoms of thyroid disease  - TSH [E]; Future    7. Adult situational stress disorder  Discussed conservative management strategies for stress and anxiety    8. Severe obesity (BMI 35.0-39.9) with comorbidity (HCC)  Discussed weight loss management strategies including lower carbohydrate food choices, avoidance of starches, eating behavior modification and daily aerobic activity with a realistic goal of 1 to 2 pound weight reduction per week.  Discussed risks and benefits of medications including indications, anticipated results and duration of treatment    9. Need for vaccination  Reviewed age-appropriate medications.  Tdap booster given.  Patient wishes to think about the shingles vaccine  - TdaP (Boostrix/Adacel) Vaccine (> 7 Y)    10. Encounter for vitamin deficiency screening  Will check vitamin D and B12 levels  - Vitamin D [E]; Future  - Vitamin B12 [E]; Future    11. Screening for human papillomavirus (HPV)  Will check HPV screening from Pap smear  - Hpv High Risk , Thin Prep Collection; Future    12. Encounter for screening mammogram for breast cancer  Discussed importance of monthly self breast exams and annual  mammograms.  Patient wishes to schedule her mammogram at [facility] 2D+3D SCREENING BILAT (CPT=77067/95634); Future    13. Memory changes  I discussed age related memory changes as well as contributing factors especially metabolic causes, situational stressors etc.  Will rule out metabolic causes, anticipatory guidance reviewed

## 2024-08-14 DIAGNOSIS — R73.01 IMPAIRED FASTING GLUCOSE: ICD-10-CM

## 2024-08-14 DIAGNOSIS — Z13.220 SCREENING, LIPID: Primary | ICD-10-CM

## 2024-08-14 LAB
EST. AVERAGE GLUCOSE BLD GHB EST-MCNC: 120 MG/DL (ref 68–126)
HBA1C MFR BLD: 5.8 % (ref ?–5.7)

## 2024-08-17 LAB
.: NORMAL
.: NORMAL

## 2024-08-30 ENCOUNTER — TELEPHONE (OUTPATIENT)
Dept: FAMILY MEDICINE CLINIC | Facility: CLINIC | Age: 59
End: 2024-08-30

## 2024-09-23 ENCOUNTER — TELEPHONE (OUTPATIENT)
Dept: FAMILY MEDICINE CLINIC | Facility: CLINIC | Age: 59
End: 2024-09-23

## 2024-09-23 NOTE — TELEPHONE ENCOUNTER
Contacted patient regarding scheduling mammogram.  States she has been busy and will call to schedule.  Order faxed to [facility] per patient's request.   The patient is a 76y Female complaining of suture/staple removal.

## 2024-10-15 ENCOUNTER — TELEPHONE (OUTPATIENT)
Dept: FAMILY MEDICINE CLINIC | Facility: CLINIC | Age: 59
End: 2024-10-15

## 2024-10-17 ENCOUNTER — HOSPITAL ENCOUNTER (OUTPATIENT)
Dept: GENERAL RADIOLOGY | Age: 59
Discharge: HOME | End: 2024-10-17
Payer: COMMERCIAL

## 2024-10-17 ENCOUNTER — OFFICE VISIT (OUTPATIENT)
Dept: FAMILY MEDICINE CLINIC | Facility: CLINIC | Age: 59
End: 2024-10-17
Payer: COMMERCIAL

## 2024-10-17 VITALS
DIASTOLIC BLOOD PRESSURE: 84 MMHG | HEART RATE: 65 BPM | SYSTOLIC BLOOD PRESSURE: 132 MMHG | BODY MASS INDEX: 38 KG/M2 | TEMPERATURE: 98 F | WEIGHT: 231 LBS | RESPIRATION RATE: 16 BRPM | OXYGEN SATURATION: 99 %

## 2024-10-17 DIAGNOSIS — M25.562 ACUTE PAIN OF LEFT KNEE: ICD-10-CM

## 2024-10-17 DIAGNOSIS — M25.562 ACUTE PAIN OF LEFT KNEE: Primary | ICD-10-CM

## 2024-10-17 PROCEDURE — 3075F SYST BP GE 130 - 139MM HG: CPT

## 2024-10-17 PROCEDURE — 73562 X-RAY EXAM OF KNEE 3: CPT

## 2024-10-17 PROCEDURE — 3079F DIAST BP 80-89 MM HG: CPT

## 2024-10-17 PROCEDURE — 99213 OFFICE O/P EST LOW 20 MIN: CPT

## 2024-10-17 NOTE — PROGRESS NOTES
HPI:   Mayelin is a 59 yr. Old female here today for left knee pain.  Per patient she fell on some rocks while hiking a couple of weeks ago.  Patient reports she did have some swelling immediately, iced and swelling has improved.  Denies pain or weakness with ambulation.  Sharp pain when she bumps the knee.         No current outpatient medications on file.      Past Medical History:    Bowen's disease of lower extremity    RIGHT KNEE      Past Surgical History:   Procedure Laterality Date    Other surgical history Left 2019    cubital tunnel release    Skin surgery      EXCISION OF LESION RIGHT KNEE      Family History   Problem Relation Age of Onset    Heart Disorder Father     Hypertension Father     Lipids Father     Dementia Mother     Heart Disorder Mother         a-fib    Osteoporosis Mother     Lipids Sister     Hypertension Sister     Lipids Sister     Hypertension Sister     Lipids Sister     Thyroid disease Sister     Heart Disorder Brother 54        [place]    Cancer Brother         kidney    Cancer Brother         non-Hodkin's lymphoma    Hypertension Brother     Heart Disorder Brother     Diabetes Brother     Hypertension Brother     Diabetes Brother     Heart Disorder Brother 60        [place]    Hypertension Brother     Diabetes Brother     Hypertension Brother     Heart Disorder Brother     Hypertension Brother     Heart Disorder Brother 53        [place]    Hypertension Brother       Social History     Socioeconomic History    Marital status:    Tobacco Use    Smoking status: Former     Current packs/day: 0.00     Types: Cigarettes     Quit date: 11/15/1995     Years since quittin.9     Passive exposure: Never    Smokeless tobacco: Never   Vaping Use    Vaping status: Never Used   Substance and Sexual Activity    Alcohol use: Yes     Comment: occ    Drug use: No   Other Topics Concern    Caffeine Concern No     Comment: 2 cups per day    Exercise No    Seat Belt Yes    Stress Concern Yes     Weight Concern Yes         REVIEW OF SYSTEMS:   Review of Systems   Constitutional:  Negative for chills, fatigue, fever and unexpected weight change.   Cardiovascular:  Negative for leg swelling.   Musculoskeletal:  Positive for arthralgias (see hpi). Negative for gait problem.   Skin:  Negative for color change.   Neurological:  Negative for weakness and numbness.       EXAM:   /84 (BP Location: Left arm, Patient Position: Sitting, Cuff Size: large)   Pulse 65   Temp 97.7 °F (36.5 °C) (Temporal)   Resp 16   Wt 231 lb (104.8 kg)   LMP 01/15/2013   SpO2 99%   BMI 37.86 kg/m²   Physical Exam  Vitals and nursing note reviewed.   Constitutional:       General: She is not in acute distress.     Appearance: Normal appearance.   HENT:      Head: Atraumatic.   Cardiovascular:      Rate and Rhythm: Normal rate.      Pulses: Normal pulses.   Pulmonary:      Effort: Pulmonary effort is normal.   Musculoskeletal:         General: Tenderness (left patella) present. No swelling. Normal range of motion.   Skin:     General: Skin is warm and dry.      Findings: Bruising (faint left knee) present.   Neurological:      Mental Status: She is alert.         ASSESSMENT AND PLAN:   Diagnoses and all orders for this visit:    Acute pain of left knee  -     XR KNEE (3 VIEWS), LEFT (CPT=73562); Future     -Discussed xray today and will notify patient of results.  Recommend activity as tolerated, ice for 10 minutes at a time, avoid kneeling.  -Return for persistent or worsening symptoms, sooner as needed, call with questions.  -Patient verbalized understanding and in agreement with treatment plan.    20 minutes were spent on assessment, education, and discussion of plan.    MADDI Mason

## (undated) DIAGNOSIS — N64.4 BREAST PAIN, LEFT: Primary | ICD-10-CM